# Patient Record
Sex: FEMALE | Race: WHITE | NOT HISPANIC OR LATINO | Employment: OTHER | URBAN - NONMETROPOLITAN AREA
[De-identification: names, ages, dates, MRNs, and addresses within clinical notes are randomized per-mention and may not be internally consistent; named-entity substitution may affect disease eponyms.]

---

## 2017-12-11 ENCOUNTER — APPOINTMENT (OUTPATIENT)
Dept: GENERAL RADIOLOGY | Facility: HOSPITAL | Age: 62
End: 2017-12-11

## 2017-12-11 ENCOUNTER — HOSPITAL ENCOUNTER (EMERGENCY)
Facility: HOSPITAL | Age: 62
Discharge: HOME | End: 2017-12-11
Attending: EMERGENCY MEDICINE | Admitting: EMERGENCY MEDICINE

## 2017-12-11 ENCOUNTER — APPOINTMENT (OUTPATIENT)
Dept: CT IMAGING | Facility: HOSPITAL | Age: 62
End: 2017-12-11

## 2017-12-11 VITALS
SYSTOLIC BLOOD PRESSURE: 167 MMHG | RESPIRATION RATE: 18 BRPM | TEMPERATURE: 98.8 F | WEIGHT: 145 LBS | BODY MASS INDEX: 23.3 KG/M2 | OXYGEN SATURATION: 95 % | HEART RATE: 99 BPM | DIASTOLIC BLOOD PRESSURE: 88 MMHG | HEIGHT: 66 IN

## 2017-12-11 DIAGNOSIS — R10.31 BILATERAL GROIN PAIN: ICD-10-CM

## 2017-12-11 DIAGNOSIS — R10.32 BILATERAL GROIN PAIN: ICD-10-CM

## 2017-12-11 DIAGNOSIS — J20.9 ACUTE BRONCHITIS, UNSPECIFIED ORGANISM: ICD-10-CM

## 2017-12-11 DIAGNOSIS — R91.8 MASS OF UPPER LOBE OF RIGHT LUNG: ICD-10-CM

## 2017-12-11 DIAGNOSIS — R07.89 LEFT-SIDED CHEST WALL PAIN: Primary | ICD-10-CM

## 2017-12-11 LAB
ALBUMIN SERPL-MCNC: 4.3 G/DL (ref 3.5–5)
ALBUMIN/GLOB SERPL: 1.4 G/DL (ref 1–2)
ALP SERPL-CCNC: 92 U/L (ref 38–126)
ALT SERPL W P-5'-P-CCNC: 35 U/L (ref 13–69)
ANION GAP SERPL CALCULATED.3IONS-SCNC: 13.7 MMOL/L
AST SERPL-CCNC: 41 U/L (ref 15–46)
BASOPHILS # BLD AUTO: 0.04 10*3/MM3 (ref 0–0.2)
BASOPHILS NFR BLD AUTO: 0.5 % (ref 0–2.5)
BILIRUB SERPL-MCNC: 0.6 MG/DL (ref 0.2–1.3)
BILIRUB UR QL STRIP: NEGATIVE
BUN BLD-MCNC: 19 MG/DL (ref 7–20)
BUN/CREAT SERPL: 27.1 (ref 7.1–23.5)
CALCIUM SPEC-SCNC: 9.3 MG/DL (ref 8.4–10.2)
CHLORIDE SERPL-SCNC: 104 MMOL/L (ref 98–107)
CLARITY UR: CLEAR
CO2 SERPL-SCNC: 28 MMOL/L (ref 26–30)
COLOR UR: YELLOW
CREAT BLD-MCNC: 0.7 MG/DL (ref 0.6–1.3)
DEPRECATED RDW RBC AUTO: 38 FL (ref 37–54)
EOSINOPHIL # BLD AUTO: 0.12 10*3/MM3 (ref 0–0.7)
EOSINOPHIL NFR BLD AUTO: 1.6 % (ref 0–7)
ERYTHROCYTE [DISTWIDTH] IN BLOOD BY AUTOMATED COUNT: 11.9 % (ref 11.5–14.5)
GFR SERPL CREATININE-BSD FRML MDRD: 85 ML/MIN/1.73
GLOBULIN UR ELPH-MCNC: 3.1 GM/DL
GLUCOSE BLD-MCNC: 97 MG/DL (ref 74–98)
GLUCOSE UR STRIP-MCNC: NEGATIVE MG/DL
HCT VFR BLD AUTO: 42.6 % (ref 37–47)
HGB BLD-MCNC: 14.5 G/DL (ref 12–16)
HGB UR QL STRIP.AUTO: NEGATIVE
HOLD SPECIMEN: NORMAL
HOLD SPECIMEN: NORMAL
IMM GRANULOCYTES # BLD: 0.03 10*3/MM3 (ref 0–0.06)
IMM GRANULOCYTES NFR BLD: 0.4 % (ref 0–0.6)
KETONES UR QL STRIP: NEGATIVE
LEUKOCYTE ESTERASE UR QL STRIP.AUTO: NEGATIVE
LYMPHOCYTES # BLD AUTO: 0.8 10*3/MM3 (ref 0.6–3.4)
LYMPHOCYTES NFR BLD AUTO: 10.4 % (ref 10–50)
MCH RBC QN AUTO: 29.8 PG (ref 27–31)
MCHC RBC AUTO-ENTMCNC: 34 G/DL (ref 30–37)
MCV RBC AUTO: 87.5 FL (ref 81–99)
MONOCYTES # BLD AUTO: 0.78 10*3/MM3 (ref 0–0.9)
MONOCYTES NFR BLD AUTO: 10.2 % (ref 0–12)
NEUTROPHILS # BLD AUTO: 5.89 10*3/MM3 (ref 2–6.9)
NEUTROPHILS NFR BLD AUTO: 76.9 % (ref 37–80)
NITRITE UR QL STRIP: NEGATIVE
NRBC BLD MANUAL-RTO: 0 /100 WBC (ref 0–0)
PH UR STRIP.AUTO: 7.5 [PH] (ref 5–8)
PLATELET # BLD AUTO: 199 10*3/MM3 (ref 130–400)
PMV BLD AUTO: 11.3 FL (ref 6–12)
POTASSIUM BLD-SCNC: 3.7 MMOL/L (ref 3.5–5.1)
PROT SERPL-MCNC: 7.4 G/DL (ref 6.3–8.2)
PROT UR QL STRIP: NEGATIVE
RBC # BLD AUTO: 4.87 10*6/MM3 (ref 4.2–5.4)
SODIUM BLD-SCNC: 142 MMOL/L (ref 137–145)
SP GR UR STRIP: 1.02 (ref 1–1.03)
TROPONIN I SERPL-MCNC: <0.012 NG/ML (ref 0–0.03)
TROPONIN I SERPL-MCNC: <0.012 NG/ML (ref 0–0.03)
UROBILINOGEN UR QL STRIP: NORMAL
WBC NRBC COR # BLD: 7.66 10*3/MM3 (ref 4.8–10.8)
WHOLE BLOOD HOLD SPECIMEN: NORMAL
WHOLE BLOOD HOLD SPECIMEN: NORMAL

## 2017-12-11 PROCEDURE — 71275 CT ANGIOGRAPHY CHEST: CPT

## 2017-12-11 PROCEDURE — 36415 COLL VENOUS BLD VENIPUNCTURE: CPT

## 2017-12-11 PROCEDURE — 85025 COMPLETE CBC W/AUTO DIFF WBC: CPT | Performed by: PHYSICIAN ASSISTANT

## 2017-12-11 PROCEDURE — 72170 X-RAY EXAM OF PELVIS: CPT

## 2017-12-11 PROCEDURE — 80053 COMPREHEN METABOLIC PANEL: CPT | Performed by: PHYSICIAN ASSISTANT

## 2017-12-11 PROCEDURE — 99284 EMERGENCY DEPT VISIT MOD MDM: CPT

## 2017-12-11 PROCEDURE — 0 IOPAMIDOL 61 % SOLUTION: Performed by: EMERGENCY MEDICINE

## 2017-12-11 PROCEDURE — 84484 ASSAY OF TROPONIN QUANT: CPT | Performed by: PHYSICIAN ASSISTANT

## 2017-12-11 PROCEDURE — 93005 ELECTROCARDIOGRAM TRACING: CPT | Performed by: PHYSICIAN ASSISTANT

## 2017-12-11 PROCEDURE — 93005 ELECTROCARDIOGRAM TRACING: CPT | Performed by: EMERGENCY MEDICINE

## 2017-12-11 PROCEDURE — 81003 URINALYSIS AUTO W/O SCOPE: CPT

## 2017-12-11 PROCEDURE — 71020 HC CHEST PA AND LATERAL: CPT

## 2017-12-11 RX ORDER — SODIUM CHLORIDE 0.9 % (FLUSH) 0.9 %
10 SYRINGE (ML) INJECTION AS NEEDED
Status: DISCONTINUED | OUTPATIENT
Start: 2017-12-11 | End: 2017-12-11 | Stop reason: HOSPADM

## 2017-12-11 RX ORDER — AZITHROMYCIN 250 MG/1
TABLET, FILM COATED ORAL
Qty: 6 TABLET | Refills: 0 | Status: SHIPPED | OUTPATIENT
Start: 2017-12-11 | End: 2017-12-11

## 2017-12-11 RX ORDER — CLONAZEPAM 1 MG/1
2 TABLET ORAL 3 TIMES DAILY PRN
COMMUNITY

## 2017-12-11 RX ORDER — HYDROCODONE BITARTRATE AND ACETAMINOPHEN 10; 325 MG/1; MG/1
1 TABLET ORAL EVERY 6 HOURS PRN
COMMUNITY
End: 2018-03-06 | Stop reason: ALTCHOICE

## 2017-12-11 RX ORDER — HYDROCODONE BITARTRATE AND ACETAMINOPHEN 5; 325 MG/1; MG/1
1 TABLET ORAL ONCE
Status: COMPLETED | OUTPATIENT
Start: 2017-12-11 | End: 2017-12-11

## 2017-12-11 RX ORDER — AZITHROMYCIN 250 MG/1
TABLET, FILM COATED ORAL
Qty: 6 TABLET | Refills: 0 | Status: SHIPPED | OUTPATIENT
Start: 2017-12-11

## 2017-12-11 RX ADMIN — IOPAMIDOL 94 ML: 612 INJECTION, SOLUTION INTRAVENOUS at 18:45

## 2017-12-11 RX ADMIN — HYDROCODONE BITARTRATE AND ACETAMINOPHEN 1 TABLET: 5; 325 TABLET ORAL at 17:02

## 2017-12-11 NOTE — ED NOTES
Patient requested to take own supply of Klonopin 2 mg po.  Stated she has not had it today and is feeling upset and anxious.  Baltazar MONTEMAYOR notified and stated she can taker own supply.  Patient took own supply of Klonopin.       Arlette Davis RN  12/11/17 3081

## 2017-12-11 NOTE — ED PROVIDER NOTES
Subjective   HPI Comments: 62-year-old female with no previous cardiac history.  She did have a nuclear stress test in 2003 that was normal, otherwise no other cardiac testing, according to her report.  She is here with a mild to moderate squeezing or cramping pain in her left lateral chest and axilla radiating to her left arm for 2 days, became constant at 4 AM this morning.  Somewhat worse with movement of the arm or palpation of the chest.  No shortness of breath.  The pain is somewhat worse with deep breathing.  Also has had a productive cough of green sputum since yesterday.  She does not smoke cigarettes or have COPD or asthma.  She stopped smoking 3 years ago.  Some nasal congestion as well.  No sore throat or earache.  No fever.  Her cardiac risk factors include hypertension, hyperlipidemia and family history of CAD.  She is not on medication for high blood pressure or hyperlipidemia.  No history of DVT or PE.    Her third chief complaint is bilateral groin aching discomfort since 4 days prior to arrival without injury, progressively worsening.  No injury other than sitting in a chair for long periods of time.  No previous history of hip surgery or inguinal hernias.    Aggravating factors: Movement or coughing.  Alleviating factors: None.  Treatment prior to arrival: None.      History provided by:  Patient  History limited by:  Acuity of condition   used: No        Review of Systems   Constitutional: Negative.    HENT: Positive for rhinorrhea.    Eyes: Negative.    Respiratory: Positive for cough.    Cardiovascular: Positive for chest pain.   Gastrointestinal: Negative.  Negative for abdominal pain.   Endocrine: Negative.    Genitourinary: Negative for dysuria.   Musculoskeletal: Positive for arthralgias.   Skin: Negative.    Allergic/Immunologic: Negative.    Neurological: Negative.    Hematological: Negative.    Psychiatric/Behavioral: Negative.    All other systems reviewed and are  negative.      Past Medical History:   Diagnosis Date   • Anxiety    • Chiari I malformation    • Cluster headaches        Allergies   Allergen Reactions   • Aspirin GI Bleeding   • Nsaids GI Bleeding   • Penicillins        Past Surgical History:   Procedure Laterality Date   • APPENDECTOMY     • CHOLECYSTECTOMY     • DILATATION AND CURETTAGE     • HYSTERECTOMY     • NASAL SEPTUM SURGERY     • TUBAL ABDOMINAL LIGATION         History reviewed. No pertinent family history.    Social History     Social History   • Marital status:      Spouse name: N/A   • Number of children: N/A   • Years of education: N/A     Social History Main Topics   • Smoking status: Former Smoker   • Smokeless tobacco: None   • Alcohol use No   • Drug use: No   • Sexual activity: Not Asked     Other Topics Concern   • None     Social History Narrative   • None           Objective   Physical Exam   Constitutional: She is oriented to person, place, and time. She appears well-developed and well-nourished. No distress.   HENT:   Head: Normocephalic and atraumatic.   Right Ear: External ear normal.   Left Ear: External ear normal.   Eyes: EOM are normal. Pupils are equal, round, and reactive to light.   Neck: Normal range of motion. Neck supple.   Cardiovascular: Normal rate, regular rhythm and normal heart sounds.    Pulmonary/Chest: Effort normal and breath sounds normal. No stridor. She has no wheezes. She exhibits tenderness.   Patient examined with alexia GUTIERREZ present.  No rash present on the chest or back.  Tenderness to the left lateral ribs and left upper arm noted.  No signs of abscess or infection.   Abdominal: Soft. She exhibits no distension. There is no tenderness. There is no rebound and no guarding.   Both inguinal areas were examined with BRENDA hale present.  No adenopathy or hernias present.  No redness or heat.   Musculoskeletal: Normal range of motion. She exhibits no edema.   Neurological: She is alert and oriented to  person, place, and time.   Skin: Skin is warm and dry. No rash noted. She is not diaphoretic.   Psychiatric: She has a normal mood and affect. Her behavior is normal. Judgment and thought content normal.   Nursing note and vitals reviewed.      ECG 12 Lead    Date/Time: 12/11/2017 5:01 PM  Performed by: SCOTT CORDOVA  Authorized by: HANNAH JUÁREZ   Comments: EKG: Sinus tachycardia rate of 103.  Poor anterior R-wave progression.  No ischemia or ectopy.  Normal axis.      ECG 12 Lead    Date/Time: 12/11/2017 7:34 PM  Performed by: SCOTT CORDOVA  Authorized by: HANNAH JUÁREZ   Comments: EKG: Sinus rhythm rate 89.  Poor anterior R-wave progression.  Nonspecific ST and T-wave changes.  No ectopy.               ED Course  ED Course   Comment By Time   I highly recommended pulmonary evaluation at our facility.  The patient refused.  She states that if cancer is likely, she would prefer to go to [facility] cancer center. Scott Cordova PA-C 12/11 2027   Dr. Barker( oncology): Call pulmonary for bx, Scott Cordova PA-C 12/11 2039   Jarred: Took the patient's number and will have someone call her tomorrow.  Call [phone] for follow-up. Scott Cordova PA-C 12/11 2052   I initially neglected to put right upper lobe lung mass in her discharge diagnoses and I edited it.  The patient was well aware of the right upper lung mass as well as possible metastases to multiple locations as I explained this to her.  She understands the importance of following up with the pulmonologist at  that she requested.  She understands that lung cancer with metastasis cannot be excluded in the ER and that a pulmonology follow-up and lung biopsy will be necessary. Scott Cordova PA-C 12/12 0148                  MDM  Number of Diagnoses or Management Options  Acute bronchitis, unspecified organism: new and requires workup  Bilateral groin pain: new and requires workup  Left-sided chest wall pain: new and requires workup  Mass of  upper lobe of right lung: new and requires workup     Amount and/or Complexity of Data Reviewed  Clinical lab tests: reviewed and ordered  Tests in the radiology section of CPT®: ordered and reviewed  Tests in the medicine section of CPT®: ordered and reviewed  Decide to obtain previous medical records or to obtain history from someone other than the patient: yes  Discuss the patient with other providers: yes  Independent visualization of images, tracings, or specimens: yes    Risk of Complications, Morbidity, and/or Mortality  Presenting problems: high  Diagnostic procedures: moderate  Management options: high  General comments: The patient was made aware of the right upper lobe lung mass that is suspicious for cancer with metastases to both lungs, liver and lymph nodes as well as adrenal glands.  She understands the importance of following up with pulmonary at  as soon as possible for lung biopsy.  She requested  and refuses to see local pulmonary.    Patient Progress  Patient progress: stable      Final diagnoses:   Left-sided chest wall pain   Acute bronchitis, unspecified organism   Bilateral groin pain   Mass of upper lobe of right lung            Stephen Gao PA-C  12/11/17 2106       Stephen Gao PA-C  12/12/17 0148

## 2017-12-12 NOTE — DISCHARGE INSTRUCTIONS
Continue hydrocodone as needed for pain.    Follow-up with pulmonary at , at your request, in one to 2 days.  If they don't call you, call them.  The phone number is [phone]. We spoke to Dr. Jarred phillips.    Return to the ER for markedly worsening chest pain, sustained heart racing, passing out, new or worsening symptoms.

## 2018-03-06 ENCOUNTER — HOSPITAL ENCOUNTER (EMERGENCY)
Facility: HOSPITAL | Age: 63
Discharge: ANOTHER HOSPITAL | End: 2018-03-06
Attending: EMERGENCY MEDICINE | Admitting: EMERGENCY MEDICINE

## 2018-03-06 ENCOUNTER — APPOINTMENT (OUTPATIENT)
Dept: CT IMAGING | Facility: HOSPITAL | Age: 63
End: 2018-03-06

## 2018-03-06 VITALS
HEIGHT: 67 IN | HEART RATE: 111 BPM | TEMPERATURE: 98.9 F | BODY MASS INDEX: 19.3 KG/M2 | WEIGHT: 123 LBS | SYSTOLIC BLOOD PRESSURE: 139 MMHG | RESPIRATION RATE: 17 BRPM | DIASTOLIC BLOOD PRESSURE: 81 MMHG | OXYGEN SATURATION: 91 %

## 2018-03-06 DIAGNOSIS — R09.02 HYPOXIA: Primary | ICD-10-CM

## 2018-03-06 DIAGNOSIS — I26.99 OTHER ACUTE PULMONARY EMBOLISM WITHOUT ACUTE COR PULMONALE (HCC): ICD-10-CM

## 2018-03-06 LAB
ALBUMIN SERPL-MCNC: 3.5 G/DL (ref 3.5–5)
ALBUMIN/GLOB SERPL: 1.2 G/DL (ref 1–2)
ALP SERPL-CCNC: 163 U/L (ref 38–126)
ALT SERPL W P-5'-P-CCNC: 40 U/L (ref 13–69)
ANION GAP SERPL CALCULATED.3IONS-SCNC: 16 MMOL/L
AST SERPL-CCNC: 71 U/L (ref 15–46)
BASOPHILS # BLD MANUAL: 0.05 10*3/MM3 (ref 0–0.2)
BASOPHILS NFR BLD AUTO: 1 % (ref 0–2.5)
BILIRUB SERPL-MCNC: 0.6 MG/DL (ref 0.2–1.3)
BUN BLD-MCNC: 29 MG/DL (ref 7–20)
BUN/CREAT SERPL: 36.3 (ref 7.1–23.5)
CALCIUM SPEC-SCNC: 9 MG/DL (ref 8.4–10.2)
CHLORIDE SERPL-SCNC: 104 MMOL/L (ref 98–107)
CO2 SERPL-SCNC: 28 MMOL/L (ref 26–30)
CREAT BLD-MCNC: 0.8 MG/DL (ref 0.6–1.3)
DEPRECATED RDW RBC AUTO: 48.4 FL (ref 37–54)
EOSINOPHIL # BLD MANUAL: 0.05 10*3/MM3 (ref 0–0.7)
EOSINOPHIL NFR BLD MANUAL: 1 % (ref 0–7)
ERYTHROCYTE [DISTWIDTH] IN BLOOD BY AUTOMATED COUNT: 15.6 % (ref 11.5–14.5)
GFR SERPL CREATININE-BSD FRML MDRD: 73 ML/MIN/1.73
GIANT PLATELETS: ABNORMAL
GLOBULIN UR ELPH-MCNC: 3 GM/DL
GLUCOSE BLD-MCNC: 96 MG/DL (ref 74–98)
HCT VFR BLD AUTO: 37.8 % (ref 37–47)
HGB BLD-MCNC: 12.2 G/DL (ref 12–16)
HOLD SPECIMEN: NORMAL
HOLD SPECIMEN: NORMAL
LYMPHOCYTES # BLD MANUAL: 0.79 10*3/MM3 (ref 0.6–3.4)
LYMPHOCYTES NFR BLD MANUAL: 16 % (ref 0–12)
LYMPHOCYTES NFR BLD MANUAL: 17 % (ref 10–50)
MCH RBC QN AUTO: 28.4 PG (ref 27–31)
MCHC RBC AUTO-ENTMCNC: 32.3 G/DL (ref 30–37)
MCV RBC AUTO: 88.1 FL (ref 81–99)
MONOCYTES # BLD AUTO: 0.75 10*3/MM3 (ref 0–0.9)
NEUTROPHILS # BLD AUTO: 3.03 10*3/MM3 (ref 2–6.9)
NEUTROPHILS NFR BLD MANUAL: 61 % (ref 37–80)
NEUTS BAND NFR BLD MANUAL: 4 % (ref 0–6)
NT-PROBNP SERPL-MCNC: 503 PG/ML (ref 0–125)
PLATELET # BLD AUTO: 400 10*3/MM3 (ref 130–400)
PMV BLD AUTO: 9.6 FL (ref 6–12)
POLYCHROMASIA BLD QL SMEAR: ABNORMAL
POTASSIUM BLD-SCNC: 4 MMOL/L (ref 3.5–5.1)
PROT SERPL-MCNC: 6.5 G/DL (ref 6.3–8.2)
RBC # BLD AUTO: 4.29 10*6/MM3 (ref 4.2–5.4)
SCAN SLIDE: NORMAL
SMALL PLATELETS BLD QL SMEAR: ABNORMAL
SODIUM BLD-SCNC: 144 MMOL/L (ref 137–145)
TROPONIN I SERPL-MCNC: <0.012 NG/ML (ref 0–0.03)
WBC MORPH BLD: NORMAL
WBC NRBC COR # BLD: 4.66 10*3/MM3 (ref 4.8–10.8)
WHOLE BLOOD HOLD SPECIMEN: NORMAL
WHOLE BLOOD HOLD SPECIMEN: NORMAL

## 2018-03-06 PROCEDURE — 85025 COMPLETE CBC W/AUTO DIFF WBC: CPT

## 2018-03-06 PROCEDURE — 93005 ELECTROCARDIOGRAM TRACING: CPT

## 2018-03-06 PROCEDURE — 71275 CT ANGIOGRAPHY CHEST: CPT

## 2018-03-06 PROCEDURE — 80053 COMPREHEN METABOLIC PANEL: CPT

## 2018-03-06 PROCEDURE — 0 IOPAMIDOL 61 % SOLUTION: Performed by: EMERGENCY MEDICINE

## 2018-03-06 PROCEDURE — 85007 BL SMEAR W/DIFF WBC COUNT: CPT

## 2018-03-06 PROCEDURE — 99284 EMERGENCY DEPT VISIT MOD MDM: CPT

## 2018-03-06 PROCEDURE — 83880 ASSAY OF NATRIURETIC PEPTIDE: CPT

## 2018-03-06 PROCEDURE — 84484 ASSAY OF TROPONIN QUANT: CPT

## 2018-03-06 RX ORDER — PHENOL 1.4 %
600 AEROSOL, SPRAY (ML) MUCOUS MEMBRANE DAILY
COMMUNITY

## 2018-03-06 RX ORDER — PROCHLORPERAZINE MALEATE 10 MG
10 TABLET ORAL EVERY 6 HOURS PRN
COMMUNITY

## 2018-03-06 RX ORDER — DEXAMETHASONE 6 MG/1
8 TABLET ORAL DAILY
COMMUNITY

## 2018-03-06 RX ORDER — SODIUM CHLORIDE 0.9 % (FLUSH) 0.9 %
10 SYRINGE (ML) INJECTION AS NEEDED
Status: DISCONTINUED | OUTPATIENT
Start: 2018-03-06 | End: 2018-03-06 | Stop reason: HOSPADM

## 2018-03-06 RX ORDER — ONDANSETRON 4 MG/1
4 TABLET, FILM COATED ORAL EVERY 8 HOURS PRN
COMMUNITY

## 2018-03-06 RX ORDER — OXYCODONE HCL 10 MG/1
10 TABLET, FILM COATED, EXTENDED RELEASE ORAL EVERY 12 HOURS
COMMUNITY

## 2018-03-06 RX ADMIN — IOPAMIDOL 100 ML: 612 INJECTION, SOLUTION INTRAVENOUS at 13:25

## 2018-03-06 NOTE — ED PROVIDER NOTES
Subjective   History of Present Illness   62-year-old female with a history of stage IV non-small cell lung cancer with multiple metastases presenting with shortness of breath.  Apparently patient has had several weeks of shortness of breath, dry cough, O2 sat of 75% on room air.  She does use oxygen occasionally at home, up to 3 L, and with that her O2 sat will come up into the 88-95 region.  She saw her oncologist yesterday but forgot to tell him this and when she did, he told her to go to the ER to be evaluated for possible pneumonitis.  She states that she does have some pain up into her left scapula as well as several weeks of cough review of white sputum with occasional blood.  This started after she had a biopsy of her lung.  She has bilateral trace swelling in her feet but no swelling of her leg otherwise, fevers, chills, history of blood clots, or other specific symptoms.      Review of Systems   Respiratory: Positive for cough and shortness of breath.    All other systems reviewed and are negative.      Past Medical History:   Diagnosis Date   • Anxiety    • Chiari I malformation    • Cluster headaches        Allergies   Allergen Reactions   • Aspirin GI Bleeding   • Nsaids GI Bleeding   • Penicillins        Past Surgical History:   Procedure Laterality Date   • APPENDECTOMY     • CHOLECYSTECTOMY     • DILATATION AND CURETTAGE     • HYSTERECTOMY     • NASAL SEPTUM SURGERY     • TUBAL ABDOMINAL LIGATION         No family history on file.    Social History     Social History   • Marital status:      Social History Main Topics   • Smoking status: Former Smoker   • Alcohol use No   • Drug use: No           Objective   Physical Exam   Constitutional: She is oriented to person, place, and time. She appears well-developed and well-nourished. No distress.   HENT:   Head: Normocephalic.   Mouth/Throat: Oropharynx is clear and moist. No oropharyngeal exudate.   Eyes: No scleral icterus.   Neck: Neck supple.  No tracheal deviation present.   Cardiovascular: Regular rhythm, normal heart sounds and intact distal pulses.  Exam reveals no gallop and no friction rub.    No murmur heard.  Tachycardic   Pulmonary/Chest: Effort normal. No stridor. No respiratory distress. She has no wheezes. She has rales (dry crackles LLL). She exhibits no tenderness.   Abdominal: Soft. She exhibits no distension and no mass. There is no tenderness. There is no rebound and no guarding.   Musculoskeletal: She exhibits no edema or deformity.   Neurological: She is alert and oriented to person, place, and time.   Skin: Skin is warm and dry. She is not diaphoretic. No erythema. No pallor.   Psychiatric: She has a normal mood and affect. Her behavior is normal.   Nursing note and vitals reviewed.      Procedures         ED Course  ED Course                  MDM   82-year-old female with known cancer here with shortness of breath.  Afebrile, tachycardic and hypoxic to room air that improves to 88-95% on 3 L.  High concern for PE versus pneumonitis versus other PNA vs pntx vs effusion vs other. Plan for labs, EKG, CT PE and reassessment.     1:51 PM Labs reassuring. CT PE shows extensive metastatic disease of chest and upper abdomen as well as emboli in both lower lobe segmental pulmonary arteries with no e/o R heart strain. I updated the family regarding the results as well as the need to talk to and likely transfer the patient back to [facility].  They did inform me that she has metastases to the brain, so I will defer anticoagulation at this time until I hear back from her cancer doctors.  I did recommend that when they get transferred, they contact the [facility] of care service regarding what I of care options may be available to them or at least to learn more about this program.    2:04 PM Discussed with the coordinator at  as well as her oncologist and they will accept her to the ED under Dr. Ngo.  I went to defer any blood thinners  at this time given that in addition to her brain metastases she did just have again a knife therapy on Friday.    Final diagnoses:   Hypoxia   Other acute pulmonary embolism without acute cor pulmonale            Venu Raygoza MD  03/06/18 0490

## 2018-04-08 ENCOUNTER — HOSPITAL ENCOUNTER (EMERGENCY)
Facility: HOSPITAL | Age: 63
Discharge: ANOTHER HOSPITAL | End: 2018-04-08
Attending: EMERGENCY MEDICINE | Admitting: EMERGENCY MEDICINE

## 2018-04-08 ENCOUNTER — APPOINTMENT (OUTPATIENT)
Dept: CT IMAGING | Facility: HOSPITAL | Age: 63
End: 2018-04-08

## 2018-04-08 VITALS
WEIGHT: 112 LBS | HEART RATE: 113 BPM | DIASTOLIC BLOOD PRESSURE: 72 MMHG | TEMPERATURE: 98.7 F | SYSTOLIC BLOOD PRESSURE: 122 MMHG | BODY MASS INDEX: 17.58 KG/M2 | RESPIRATION RATE: 20 BRPM | HEIGHT: 67 IN | OXYGEN SATURATION: 92 %

## 2018-04-08 DIAGNOSIS — J18.9 PNEUMONIA OF BOTH LUNGS DUE TO INFECTIOUS ORGANISM, UNSPECIFIED PART OF LUNG: Primary | ICD-10-CM

## 2018-04-08 LAB
ALBUMIN SERPL-MCNC: 3.2 G/DL (ref 3.5–5)
ALBUMIN/GLOB SERPL: 1.2 G/DL (ref 1–2)
ALP SERPL-CCNC: 126 U/L (ref 38–126)
ALT SERPL W P-5'-P-CCNC: 77 U/L (ref 13–69)
ANION GAP SERPL CALCULATED.3IONS-SCNC: 12.7 MMOL/L (ref 10–20)
APTT PPP: 23.1 SECONDS (ref 25–36)
AST SERPL-CCNC: 139 U/L (ref 15–46)
BASOPHILS # BLD AUTO: 0.01 10*3/MM3 (ref 0–0.2)
BASOPHILS NFR BLD AUTO: 0.2 % (ref 0–2.5)
BILIRUB SERPL-MCNC: 0.5 MG/DL (ref 0.2–1.3)
BUN BLD-MCNC: 26 MG/DL (ref 7–20)
BUN/CREAT SERPL: 32.5 (ref 7.1–23.5)
CALCIUM SPEC-SCNC: 8.9 MG/DL (ref 8.4–10.2)
CHLORIDE SERPL-SCNC: 100 MMOL/L (ref 98–107)
CO2 SERPL-SCNC: 29 MMOL/L (ref 26–30)
CREAT BLD-MCNC: 0.8 MG/DL (ref 0.6–1.3)
DEPRECATED RDW RBC AUTO: 62.7 FL (ref 37–54)
EOSINOPHIL # BLD AUTO: 0.02 10*3/MM3 (ref 0–0.7)
EOSINOPHIL NFR BLD AUTO: 0.4 % (ref 0–7)
ERYTHROCYTE [DISTWIDTH] IN BLOOD BY AUTOMATED COUNT: 19.7 % (ref 11.5–14.5)
GFR SERPL CREATININE-BSD FRML MDRD: 73 ML/MIN/1.73
GLOBULIN UR ELPH-MCNC: 2.6 GM/DL
GLUCOSE BLD-MCNC: 102 MG/DL (ref 74–98)
HCT VFR BLD AUTO: 35 % (ref 37–47)
HGB BLD-MCNC: 10.9 G/DL (ref 12–16)
HOLD SPECIMEN: NORMAL
HOLD SPECIMEN: NORMAL
IMM GRANULOCYTES # BLD: 0.02 10*3/MM3 (ref 0–0.06)
IMM GRANULOCYTES NFR BLD: 0.4 % (ref 0–0.6)
LYMPHOCYTES # BLD AUTO: 0.31 10*3/MM3 (ref 0.6–3.4)
LYMPHOCYTES NFR BLD AUTO: 6.2 % (ref 10–50)
MCH RBC QN AUTO: 28 PG (ref 27–31)
MCHC RBC AUTO-ENTMCNC: 31.1 G/DL (ref 30–37)
MCV RBC AUTO: 90 FL (ref 81–99)
MONOCYTES # BLD AUTO: 0.05 10*3/MM3 (ref 0–0.9)
MONOCYTES NFR BLD AUTO: 1 % (ref 0–12)
NEUTROPHILS # BLD AUTO: 4.56 10*3/MM3 (ref 2–6.9)
NEUTROPHILS NFR BLD AUTO: 91.8 % (ref 37–80)
NRBC BLD MANUAL-RTO: 0 /100 WBC (ref 0–0)
NT-PROBNP SERPL-MCNC: 700 PG/ML (ref 0–125)
PLATELET # BLD AUTO: 445 10*3/MM3 (ref 130–400)
PMV BLD AUTO: 9.9 FL (ref 6–12)
POTASSIUM BLD-SCNC: 4.7 MMOL/L (ref 3.5–5.1)
PROT SERPL-MCNC: 5.8 G/DL (ref 6.3–8.2)
RBC # BLD AUTO: 3.89 10*6/MM3 (ref 4.2–5.4)
SODIUM BLD-SCNC: 137 MMOL/L (ref 137–145)
TROPONIN I SERPL-MCNC: 0.03 NG/ML (ref 0–0.03)
WBC NRBC COR # BLD: 4.97 10*3/MM3 (ref 4.8–10.8)
WHOLE BLOOD HOLD SPECIMEN: NORMAL
WHOLE BLOOD HOLD SPECIMEN: NORMAL

## 2018-04-08 PROCEDURE — 80053 COMPREHEN METABOLIC PANEL: CPT | Performed by: EMERGENCY MEDICINE

## 2018-04-08 PROCEDURE — 83880 ASSAY OF NATRIURETIC PEPTIDE: CPT | Performed by: EMERGENCY MEDICINE

## 2018-04-08 PROCEDURE — 84484 ASSAY OF TROPONIN QUANT: CPT | Performed by: EMERGENCY MEDICINE

## 2018-04-08 PROCEDURE — 96375 TX/PRO/DX INJ NEW DRUG ADDON: CPT

## 2018-04-08 PROCEDURE — 25010000002 VANCOMYCIN PER 500 MG: Performed by: EMERGENCY MEDICINE

## 2018-04-08 PROCEDURE — 85025 COMPLETE CBC W/AUTO DIFF WBC: CPT | Performed by: EMERGENCY MEDICINE

## 2018-04-08 PROCEDURE — 87040 BLOOD CULTURE FOR BACTERIA: CPT | Performed by: EMERGENCY MEDICINE

## 2018-04-08 PROCEDURE — 96365 THER/PROPH/DIAG IV INF INIT: CPT

## 2018-04-08 PROCEDURE — 85730 THROMBOPLASTIN TIME PARTIAL: CPT | Performed by: EMERGENCY MEDICINE

## 2018-04-08 PROCEDURE — 25010000002 IOPAMIDOL 61 % SOLUTION: Performed by: EMERGENCY MEDICINE

## 2018-04-08 PROCEDURE — 71275 CT ANGIOGRAPHY CHEST: CPT

## 2018-04-08 PROCEDURE — 93005 ELECTROCARDIOGRAM TRACING: CPT | Performed by: EMERGENCY MEDICINE

## 2018-04-08 PROCEDURE — 36415 COLL VENOUS BLD VENIPUNCTURE: CPT

## 2018-04-08 PROCEDURE — 25010000002 CEFEPIME PER 500 MG: Performed by: EMERGENCY MEDICINE

## 2018-04-08 PROCEDURE — 99284 EMERGENCY DEPT VISIT MOD MDM: CPT

## 2018-04-08 RX ORDER — SODIUM CHLORIDE 0.9 % (FLUSH) 0.9 %
10 SYRINGE (ML) INJECTION AS NEEDED
Status: DISCONTINUED | OUTPATIENT
Start: 2018-04-08 | End: 2018-04-08 | Stop reason: HOSPADM

## 2018-04-08 RX ORDER — FOLIC ACID 1 MG/1
1 TABLET ORAL DAILY
COMMUNITY

## 2018-04-08 RX ADMIN — IOPAMIDOL 100 ML: 612 INJECTION, SOLUTION INTRAVENOUS at 17:37

## 2018-04-08 RX ADMIN — CEFEPIME 2 G: 1 INJECTION, POWDER, FOR SOLUTION INTRAMUSCULAR; INTRAVENOUS at 18:26

## 2018-04-08 RX ADMIN — SODIUM CHLORIDE 500 ML: 9 INJECTION, SOLUTION INTRAVENOUS at 16:53

## 2018-04-08 RX ADMIN — SODIUM CHLORIDE 1000 ML: 9 INJECTION, SOLUTION INTRAVENOUS at 19:11

## 2018-04-08 RX ADMIN — VANCOMYCIN HYDROCHLORIDE 1500 MG: 500 INJECTION, POWDER, LYOPHILIZED, FOR SOLUTION INTRAVENOUS at 19:12

## 2018-04-08 NOTE — ED PROVIDER NOTES
Subjective   History of Present Illness   62-year-old female with a history of stage IV lung cancer complicated by bilateral PEs on 3 L baseline and taking Lovenox now presenting with shortness of breath.  Apparently, patient exerted herself this morning and her O2 sat dropped to the low to mid 80s.  They bumped her oxygen up to 5 L and she has been maintaining a sat in the low 90s since then.  She does have cough productive of some scant pinkish mucus but this is not new for her.  Denies any fevers, chills, chest pain, or other specific symptoms.  She did have chemotherapy done 2 days ago and this was her second round.    Review of Systems   Respiratory: Positive for shortness of breath.    All other systems reviewed and are negative.      Past Medical History:   Diagnosis Date   • Anxiety    • Cancer     lung   • Chiari I malformation    • Cluster headaches        Allergies   Allergen Reactions   • Aspirin GI Bleeding   • Nsaids GI Bleeding   • Penicillins    • Ultram [Tramadol] GI Bleeding       Past Surgical History:   Procedure Laterality Date   • APPENDECTOMY     • CHOLECYSTECTOMY     • DILATATION AND CURETTAGE     • HYSTERECTOMY     • NASAL SEPTUM SURGERY     • TUBAL ABDOMINAL LIGATION         History reviewed. No pertinent family history.    Social History     Social History   • Marital status:      Social History Main Topics   • Smoking status: Former Smoker   • Alcohol use No   • Drug use: No     Other Topics Concern   • Not on file           Objective   Physical Exam   Constitutional: She is oriented to person, place, and time. She appears well-developed. No distress.   Cachectic   HENT:   Head: Normocephalic.   Mouth/Throat: Oropharynx is clear and moist. No oropharyngeal exudate.   Eyes: No scleral icterus.   Neck: Neck supple. No tracheal deviation present.   Cardiovascular: Regular rhythm, normal heart sounds and intact distal pulses.  Exam reveals no gallop and no friction rub.    No murmur  heard.  HR 100s   Pulmonary/Chest: Effort normal and breath sounds normal. No stridor. No respiratory distress. She has no wheezes. She has no rales. She exhibits no tenderness.   Abdominal: Soft. She exhibits no distension and no mass. There is no tenderness. There is no rebound and no guarding.   Musculoskeletal: She exhibits no edema or deformity.   Neurological: She is alert and oriented to person, place, and time.   Skin: Skin is warm and dry. She is not diaphoretic. No erythema. No pallor.   Psychiatric: She has a normal mood and affect. Her behavior is normal.   Nursing note and vitals reviewed.      Procedures         ED Course  ED Course                  MDM  62-year-old female here with shortness of breath in the setting of bilateral PEs and stage IV lung cancer.. Afebrile, vital signs are all for a heart rate in the 110s, O2 sat of 91% on 5 L.  She did have clear lungs on auscultation.  High concern for worsening blood clots versus PNA versus heart failure versus symptomatic anemia versus other.  Plan for labs, CT of the chest, and reassessment.    6:02 PM her rate still in the 100s.  Labs show a pro-and BMP that is 700, white count is normal at 4.97, PTT is low at 23, and a CT scan shows extensive pulmonary nodules with superimposed pneumonia.  Finally it also shows hepatic and abdominal metastases and extensive adenopathy.  Given her recent chemotherapy, on going cancer treatment, we'll start on antibiotics, send blood cultures, and discuss transfer with [facility] where she is receiving her oncological therapy.    6:12 PM Discussed w/ transfer team who agree to accept pt to  ER under Dr. Triplett.     Final diagnoses:   Pneumonia of both lungs due to infectious organism, unspecified part of lung            Venu Raygoza MD  04/08/18 4828

## 2018-04-13 LAB
BACTERIA SPEC AEROBE CULT: NORMAL
BACTERIA SPEC AEROBE CULT: NORMAL